# Patient Record
Sex: MALE
[De-identification: names, ages, dates, MRNs, and addresses within clinical notes are randomized per-mention and may not be internally consistent; named-entity substitution may affect disease eponyms.]

---

## 2019-11-06 ENCOUNTER — APPOINTMENT (OUTPATIENT)
Dept: PULMONOLOGY | Facility: CLINIC | Age: 29
End: 2019-11-06
Payer: COMMERCIAL

## 2019-11-06 ENCOUNTER — EMERGENCY (EMERGENCY)
Facility: HOSPITAL | Age: 29
LOS: 1 days | Discharge: HOME | End: 2019-11-06
Attending: EMERGENCY MEDICINE | Admitting: EMERGENCY MEDICINE
Payer: COMMERCIAL

## 2019-11-06 VITALS
RESPIRATION RATE: 16 BRPM | BODY MASS INDEX: 31.95 KG/M2 | WEIGHT: 249 LBS | OXYGEN SATURATION: 98 % | DIASTOLIC BLOOD PRESSURE: 84 MMHG | HEART RATE: 75 BPM | HEIGHT: 74 IN | SYSTOLIC BLOOD PRESSURE: 142 MMHG

## 2019-11-06 VITALS
HEART RATE: 78 BPM | RESPIRATION RATE: 16 BRPM | DIASTOLIC BLOOD PRESSURE: 75 MMHG | SYSTOLIC BLOOD PRESSURE: 127 MMHG | OXYGEN SATURATION: 100 % | TEMPERATURE: 98 F

## 2019-11-06 VITALS
HEIGHT: 74 IN | HEART RATE: 85 BPM | WEIGHT: 250 LBS | SYSTOLIC BLOOD PRESSURE: 134 MMHG | RESPIRATION RATE: 15 BRPM | OXYGEN SATURATION: 97 % | TEMPERATURE: 98 F | DIASTOLIC BLOOD PRESSURE: 82 MMHG

## 2019-11-06 DIAGNOSIS — R04.2 HEMOPTYSIS: ICD-10-CM

## 2019-11-06 LAB
ALBUMIN SERPL ELPH-MCNC: 4.3 G/DL — SIGNIFICANT CHANGE UP (ref 3.3–5)
ALP SERPL-CCNC: 84 U/L — SIGNIFICANT CHANGE UP (ref 40–120)
ALT FLD-CCNC: 39 U/L — SIGNIFICANT CHANGE UP (ref 12–78)
ANION GAP SERPL CALC-SCNC: 9 MMOL/L — SIGNIFICANT CHANGE UP (ref 5–17)
APTT BLD: 32.2 SEC — SIGNIFICANT CHANGE UP (ref 28.5–37)
AST SERPL-CCNC: 20 U/L — SIGNIFICANT CHANGE UP (ref 15–37)
BILIRUB SERPL-MCNC: 0.6 MG/DL — SIGNIFICANT CHANGE UP (ref 0.2–1.2)
BUN SERPL-MCNC: 15 MG/DL — SIGNIFICANT CHANGE UP (ref 7–23)
CALCIUM SERPL-MCNC: 8.9 MG/DL — SIGNIFICANT CHANGE UP (ref 8.5–10.1)
CHLORIDE SERPL-SCNC: 107 MMOL/L — SIGNIFICANT CHANGE UP (ref 96–108)
CO2 SERPL-SCNC: 25 MMOL/L — SIGNIFICANT CHANGE UP (ref 22–31)
CREAT SERPL-MCNC: 0.95 MG/DL — SIGNIFICANT CHANGE UP (ref 0.5–1.3)
D DIMER BLD IA.RAPID-MCNC: <150 NG/ML DDU — SIGNIFICANT CHANGE UP
GLUCOSE SERPL-MCNC: 90 MG/DL — SIGNIFICANT CHANGE UP (ref 70–99)
HCT VFR BLD CALC: 43.8 % — SIGNIFICANT CHANGE UP (ref 39–50)
HGB BLD-MCNC: 14.9 G/DL — SIGNIFICANT CHANGE UP (ref 13–17)
INR BLD: 1.05 RATIO — SIGNIFICANT CHANGE UP (ref 0.88–1.16)
MCHC RBC-ENTMCNC: 28.7 PG — SIGNIFICANT CHANGE UP (ref 27–34)
MCHC RBC-ENTMCNC: 34 GM/DL — SIGNIFICANT CHANGE UP (ref 32–36)
MCV RBC AUTO: 84.4 FL — SIGNIFICANT CHANGE UP (ref 80–100)
NRBC # BLD: 0 /100 WBCS — SIGNIFICANT CHANGE UP (ref 0–0)
PLATELET # BLD AUTO: 225 K/UL — SIGNIFICANT CHANGE UP (ref 150–400)
POTASSIUM SERPL-MCNC: 3.6 MMOL/L — SIGNIFICANT CHANGE UP (ref 3.5–5.3)
POTASSIUM SERPL-SCNC: 3.6 MMOL/L — SIGNIFICANT CHANGE UP (ref 3.5–5.3)
PROT SERPL-MCNC: 7.4 G/DL — SIGNIFICANT CHANGE UP (ref 6–8.3)
PROTHROM AB SERPL-ACNC: 11.9 SEC — SIGNIFICANT CHANGE UP (ref 10–12.9)
RBC # BLD: 5.19 M/UL — SIGNIFICANT CHANGE UP (ref 4.2–5.8)
RBC # FLD: 12.1 % — SIGNIFICANT CHANGE UP (ref 10.3–14.5)
SODIUM SERPL-SCNC: 141 MMOL/L — SIGNIFICANT CHANGE UP (ref 135–145)
TROPONIN I SERPL-MCNC: <.015 NG/ML — SIGNIFICANT CHANGE UP (ref 0.01–0.04)
WBC # BLD: 15.49 K/UL — HIGH (ref 3.8–10.5)
WBC # FLD AUTO: 15.49 K/UL — HIGH (ref 3.8–10.5)

## 2019-11-06 PROCEDURE — 71275 CT ANGIOGRAPHY CHEST: CPT | Mod: 26

## 2019-11-06 PROCEDURE — 85379 FIBRIN DEGRADATION QUANT: CPT

## 2019-11-06 PROCEDURE — 93970 EXTREMITY STUDY: CPT

## 2019-11-06 PROCEDURE — 71275 CT ANGIOGRAPHY CHEST: CPT

## 2019-11-06 PROCEDURE — 99284 EMERGENCY DEPT VISIT MOD MDM: CPT | Mod: 25

## 2019-11-06 PROCEDURE — 80053 COMPREHEN METABOLIC PANEL: CPT

## 2019-11-06 PROCEDURE — 99204 OFFICE O/P NEW MOD 45 MIN: CPT

## 2019-11-06 PROCEDURE — 85730 THROMBOPLASTIN TIME PARTIAL: CPT

## 2019-11-06 PROCEDURE — 71046 X-RAY EXAM CHEST 2 VIEWS: CPT

## 2019-11-06 PROCEDURE — 85610 PROTHROMBIN TIME: CPT

## 2019-11-06 PROCEDURE — 84484 ASSAY OF TROPONIN QUANT: CPT

## 2019-11-06 PROCEDURE — 87040 BLOOD CULTURE FOR BACTERIA: CPT

## 2019-11-06 PROCEDURE — 93010 ELECTROCARDIOGRAM REPORT: CPT

## 2019-11-06 PROCEDURE — 93970 EXTREMITY STUDY: CPT | Mod: 26

## 2019-11-06 PROCEDURE — 85027 COMPLETE CBC AUTOMATED: CPT

## 2019-11-06 PROCEDURE — 36415 COLL VENOUS BLD VENIPUNCTURE: CPT

## 2019-11-06 PROCEDURE — 93005 ELECTROCARDIOGRAM TRACING: CPT

## 2019-11-06 PROCEDURE — 99284 EMERGENCY DEPT VISIT MOD MDM: CPT

## 2019-11-06 PROCEDURE — 71046 X-RAY EXAM CHEST 2 VIEWS: CPT | Mod: 26

## 2019-11-06 RX ORDER — CLARITHROMYCIN 500 MG
1 TABLET ORAL
Qty: 1 | Refills: 0
Start: 2019-11-06 | End: 2019-11-10

## 2019-11-06 RX ORDER — SODIUM CHLORIDE 9 MG/ML
1000 INJECTION INTRAMUSCULAR; INTRAVENOUS; SUBCUTANEOUS ONCE
Refills: 0 | Status: COMPLETED | OUTPATIENT
Start: 2019-11-06 | End: 2019-11-06

## 2019-11-06 NOTE — HISTORY OF PRESENT ILLNESS
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\par \par Works as asst director facility [employer] \par No known ho tb exposure  Last March he tested negative for tb\par \par Ha staken amox in past  \par \par \par PT DESCRIPTION \par ***************\par DATE OF INITIAL VISIT 2019                 \par AGE AT INITIAL VISIT   2019 29 m                      \par REFERRING MD Dr Sami Castelan                    \par REFERRING MD FAX                 \par CONTACT                                  \par ALLERGY  2019 nka                        \par WEIGHT          2019 249 lb               \par BMI          2019 31.9                     \par SMOKING  2019 1 ppd started high school  cut down 2 y ago 1 cig a week                \par HABITS      \par 2019 Drinks once a week \par 2019 Took marijuanah last nigt\par 2019 No ivda              \par     \par FAMILY    2019 gmoter lung ca  age 78 gfather prostate ca No ho death at young age  \par OCCUPATION       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

## 2019-11-06 NOTE — ED PROVIDER NOTE - PROGRESS NOTE DETAILS
Dr Luciano called requesting the following: labs, CTA r/o PE, b/l LE venous duplex, blood cultures. If workup neg, wants pt d/c'd on abx and will f/u with him in the office in 1 week Pt asymptomatic. Pt well appearing, VSS. Mild leukocystosis noted on labs, CTA neg. Will d/c on Z nely as per Luciano request. All results discussed with patient and family. Questions answered. Pt to f/u with him in the office within 1 week. Pt verbalizes agreement and understanding of plan. No emergent concerns at this time. Pt stable for DC home.

## 2019-11-06 NOTE — ED ADULT TRIAGE NOTE - CHIEF COMPLAINT QUOTE
"I coughed up blood this morning when I woke up."  pt was seen by Dr. Luciano, sent to ED for CTA chest, venous doppler, lab work

## 2019-11-06 NOTE — ASSESSMENT
[FreeTextEntry1] : PROBLEM ASSESSMENT RECOMMENDATION\par \par HEMOPTYSIS \par ***********\par \par 11/6/2019 Pt had episode of hemoptysis at least a table spoonful \par 11/6/2019 refer to er for cta ch and V duplex to exclude vte Then blod culture and abio \par 11/6/2019 refer labs ECHO \par A1AT [facility] ab susan apla echo pt \par \par

## 2019-11-06 NOTE — ED PROVIDER NOTE - ATTENDING CONTRIBUTION TO CARE
I have personally performed a face to face diagnostic evaluation on this patient.  I have reviewed the PA note and agree with the history, exam, and plan of care, except as noted.  History and Exam by me shows patient sent to ER by Dr. Luciano for evaluation of episode of hemoptysis, around 3am, patient now feeling well, no fever, no shortness of breath, patient alert and oriented, heart and lungs clear, OP clear, abdomen soft, no pedal edema, f/u labs, ct angio, ekg, call dr. Luciano.

## 2019-11-06 NOTE — ED PROVIDER NOTE - PATIENT PORTAL LINK FT
You can access the FollowMyHealth Patient Portal offered by [facility] by registering at the following website: [email or web address]. By joining [facility]’s FollowMyHealth portal, you will also be able to view your health information using other applications (apps) compatible with our system.

## 2019-11-06 NOTE — PHYSICAL EXAM
[General Appearance - Well Developed] : well developed [Normal Appearance] : normal appearance [Well Groomed] : well groomed [General Appearance - Well Nourished] : well nourished [No Deformities] : no deformities [General Appearance - In No Acute Distress] : no acute distress [Normal Conjunctiva] : the conjunctiva exhibited no abnormalities [Eyelids - No Xanthelasma] : the eyelids demonstrated no xanthelasmas [Normal Oropharynx] : normal oropharynx [Neck Appearance] : the appearance of the neck was normal [Neck Cervical Mass (___cm)] : no neck mass was observed [Jugular Venous Distention Increased] : there was no jugular-venous distention [Thyroid Diffuse Enlargement] : the thyroid was not enlarged [Thyroid Nodule] : there were no palpable thyroid nodules [Heart Rate And Rhythm] : heart rate and rhythm were normal [Heart Sounds] : normal S1 and S2 [Murmurs] : no murmurs present [Respiration, Rhythm And Depth] : normal respiratory rhythm and effort [Exaggerated Use Of Accessory Muscles For Inspiration] : no accessory muscle use [Auscultation Breath Sounds / Voice Sounds] : lungs were clear to auscultation bilaterally [Abdomen Soft] : soft [Abdomen Tenderness] : non-tender [] : no hepato-splenomegaly [Abdomen Mass (___ Cm)] : no abdominal mass palpated [Abnormal Walk] : normal gait [Gait - Sufficient For Exercise Testing] : the gait was sufficient for exercise testing

## 2019-11-06 NOTE — ED PROVIDER NOTE - OBJECTIVE STATEMENT
30 yo M no sig PMHx presents to ED c/o hemoptysis x last night. Pt states that he woke up suddenly in the middle of night with urge to cough--- realized that sputum was completely bloody. Had another episode several minutes later- this time, blood tinged mucous. Pt had been asymptomatic prior to this episode. Pt states that this morning he woke up with very mild chest discomfort- mid-sternal 1/10- states very minimal, thinks he only notices pain since he is more alert to symptoms given last night's episode. Pt denies pleuritic chest pain, SOB, calf pain/swelling, recent travel, steroid use, fever/chills, easy bleeding/bruising, family hx bleeding disorders. Pt smokes cigarettes socially- about 1-2 cigarettes/week.

## 2019-11-06 NOTE — ED ADULT NURSE NOTE - OBJECTIVE STATEMENT
Sent by Pulmonologist. Present to ER with c/o of vomiting blood since this morning. Denies any chest pain or shortness of breath. Pulmonologist wants to order U/S, CT angio and Xray

## 2019-11-12 LAB
CULTURE RESULTS: SIGNIFICANT CHANGE UP
CULTURE RESULTS: SIGNIFICANT CHANGE UP
SPECIMEN SOURCE: SIGNIFICANT CHANGE UP
SPECIMEN SOURCE: SIGNIFICANT CHANGE UP

## 2019-12-06 NOTE — ED ADULT NURSE NOTE - PERIPHERAL VASCULAR
Spoke with patient in regards to his lab results and he is agreeable to starting weekly Vitamin D supplement. He would like the prescription sent to [facility] in [facility].    Patient # [phone]   WDL

## 2021-08-14 PROBLEM — Z78.9 OTHER SPECIFIED HEALTH STATUS: Chronic | Status: ACTIVE | Noted: 2019-11-06

## 2021-09-01 ENCOUNTER — APPOINTMENT (OUTPATIENT)
Dept: INTERNAL MEDICINE | Facility: CLINIC | Age: 31
End: 2021-09-01
Payer: COMMERCIAL

## 2021-09-01 VITALS
TEMPERATURE: 96.8 F | OXYGEN SATURATION: 97 % | SYSTOLIC BLOOD PRESSURE: 127 MMHG | RESPIRATION RATE: 17 BRPM | HEART RATE: 93 BPM | BODY MASS INDEX: 36.06 KG/M2 | DIASTOLIC BLOOD PRESSURE: 79 MMHG | WEIGHT: 281 LBS | HEIGHT: 74 IN

## 2021-09-01 DIAGNOSIS — Z87.891 PERSONAL HISTORY OF NICOTINE DEPENDENCE: ICD-10-CM

## 2021-09-01 DIAGNOSIS — Z82.49 FAMILY HISTORY OF ISCHEMIC HEART DISEASE AND OTHER DISEASES OF THE CIRCULATORY SYSTEM: ICD-10-CM

## 2021-09-01 PROCEDURE — 99385 PREV VISIT NEW AGE 18-39: CPT | Mod: 25

## 2021-09-01 PROCEDURE — 36415 COLL VENOUS BLD VENIPUNCTURE: CPT

## 2021-09-01 NOTE — HISTORY OF PRESENT ILLNESS
[FreeTextEntry1] : cpx [de-identified] : cpx\par prior 5 py tobacco d/c 2019\par not covid vaccinated

## 2021-09-01 NOTE — ASSESSMENT
[FreeTextEntry1] : labs\par wt loss discussed/increase activity\par current covid vaccination guidelines discussed

## 2021-09-02 LAB
ALBUMIN SERPL ELPH-MCNC: 4.6 G/DL
ALP BLD-CCNC: 92 U/L
ALT SERPL-CCNC: 35 U/L
ANION GAP SERPL CALC-SCNC: 14 MMOL/L
APPEARANCE: CLEAR
AST SERPL-CCNC: 22 U/L
BACTERIA: NEGATIVE
BASOPHILS # BLD AUTO: 0.09 K/UL
BASOPHILS NFR BLD AUTO: 0.8 %
BILIRUB SERPL-MCNC: 0.2 MG/DL
BILIRUBIN URINE: NEGATIVE
BLOOD URINE: NORMAL
BUN SERPL-MCNC: 14 MG/DL
CALCIUM SERPL-MCNC: 9.3 MG/DL
CHLORIDE SERPL-SCNC: 102 MMOL/L
CHOLEST SERPL-MCNC: 243 MG/DL
CO2 SERPL-SCNC: 25 MMOL/L
COLOR: YELLOW
CREAT SERPL-MCNC: 0.99 MG/DL
EOSINOPHIL # BLD AUTO: 0.19 K/UL
EOSINOPHIL NFR BLD AUTO: 1.7 %
ESTIMATED AVERAGE GLUCOSE: 111 MG/DL
GLUCOSE QUALITATIVE U: NEGATIVE
GLUCOSE SERPL-MCNC: 72 MG/DL
HBA1C MFR BLD HPLC: 5.5 %
HCT VFR BLD CALC: 47.3 %
HDLC SERPL-MCNC: 38 MG/DL
HGB BLD-MCNC: 14.6 G/DL
HYALINE CASTS: 1 /LPF
IMM GRANULOCYTES NFR BLD AUTO: 0.4 %
KETONES URINE: NEGATIVE
LDLC SERPL CALC-MCNC: NORMAL MG/DL
LEUKOCYTE ESTERASE URINE: NEGATIVE
LYMPHOCYTES # BLD AUTO: 2.93 K/UL
LYMPHOCYTES NFR BLD AUTO: 26.1 %
MAN DIFF?: NORMAL
MCHC RBC-ENTMCNC: 28.6 PG
MCHC RBC-ENTMCNC: 30.9 GM/DL
MCV RBC AUTO: 92.6 FL
MICROSCOPIC-UA: NORMAL
MONOCYTES # BLD AUTO: 0.79 K/UL
MONOCYTES NFR BLD AUTO: 7 %
NEUTROPHILS # BLD AUTO: 7.18 K/UL
NEUTROPHILS NFR BLD AUTO: 64 %
NITRITE URINE: NEGATIVE
NONHDLC SERPL-MCNC: 205 MG/DL
PH URINE: 6
PLATELET # BLD AUTO: 223 K/UL
POTASSIUM SERPL-SCNC: 4.3 MMOL/L
PROT SERPL-MCNC: 6.6 G/DL
PROTEIN URINE: NORMAL
RBC # BLD: 5.11 M/UL
RBC # FLD: 12.9 %
RED BLOOD CELLS URINE: 1 /HPF
SODIUM SERPL-SCNC: 141 MMOL/L
SPECIFIC GRAVITY URINE: 1.03
SQUAMOUS EPITHELIAL CELLS: 0 /HPF
TRIGL SERPL-MCNC: 642 MG/DL
TSH SERPL-ACNC: 1.66 UIU/ML
UROBILINOGEN URINE: NORMAL
WBC # FLD AUTO: 11.23 K/UL
WHITE BLOOD CELLS URINE: 1 /HPF

## 2021-09-04 LAB — LDLC SERPL DIRECT ASSAY-MCNC: 131 MG/DL

## 2021-09-10 ENCOUNTER — NON-APPOINTMENT (OUTPATIENT)
Age: 31
End: 2021-09-10

## 2021-10-17 NOTE — ED ADULT NURSE NOTE - EENT ASSESSMENT, MLM
Patient : Paz Worthington Age: 83 year old Sex: female   MRN: [id number] Encounter Date: 10/17/2021      History   CHIEF COMPLAINT    Chief Complaint   Patient presents with   • Fall       HPI    HPI  Paz Worthington is a 83 year old female presents emergency department with a fall.  She has dementia at baseline is oriented x2 and staff says that is her baseline.  They did noted that she was a little sleepier.  She is wheelchair-bound normally and staff had just talked on her previously and noted that she was on the floor.  She has a small laceration to the back of the scalp.  Unknown witnessed fall.      ALLERGIES:   Allergen Reactions   • Bentyl Other (See Comments)     unknown   • Penicillins Other (See Comments)     unknown       Prior to Admission Medications    ASPIRIN (ECOTRIN) 81 MG EC TABLET    Take 81 mg by mouth daily.    CHOLECALCIFEROL (CHOLECALCIFEROL) 25 MCG (1,000 UNITS) TABLET    Take 2,000 Units by mouth daily.    CLONIDINE (CATAPRES-TTS 2) 0.2 MG/24HR    Place 1 patch onto the skin 1 day a week. Mondays    FUROSEMIDE (LASIX) 20 MG TABLET    Take 20 mg by mouth daily.     HYDRALAZINE (APRESOLINE) 10 MG TABLET    Take 10 mg by mouth 3 times daily as needed (SBP > 170).     LEVOTHYROXINE 75 MCG TABLET    Take 75 mcg by mouth daily.    LISINOPRIL (ZESTRIL) 10 MG TABLET    Take 10 mg by mouth daily.     LORAZEPAM (ATIVAN) 0.5 MG TABLET    Take 0.5 mg by mouth 3 times daily.     METFORMIN (GLUCOPHAGE) 500 MG TABLET    Take 500 mg by mouth daily (with breakfast).     METOCLOPRAMIDE (REGLAN) 5 MG TABLET    Take 5 mg by mouth every 6 hours as needed for Nausea or Vomiting.    METOPROLOL SUCCINATE (TOPROL-XL) 25 MG 24 HR TABLET    Take 25 mg by mouth daily.    PAROXETINE (PAXIL) 40 MG TABLET    Take 40 mg by mouth every morning.    PIMAVANSERIN TARTRATE (NUPLAZID) 34 MG CAP    Take 34 mg by mouth daily.    POTASSIUM CHLORIDE TANJA ER PO    Take 20 mEq by mouth daily.     QUETIAPINE (SEROQUEL) 50 MG TABLET    Take 50 mg  by mouth 2 times daily.     SIMVASTATIN (ZOCOR) 40 MG TABLET    Take 40 mg by mouth nightly.        New Prescriptions    No medications on file       Past Medical History:   Diagnosis Date   • Anxiety    • Chronic kidney disease    • Congestive cardiac failure (CMS/HCC)    • Depression    • Diabetes mellitus (CMS/HCC)    • Essential (primary) hypertension    • High cholesterol    • Parkinson's disease (CMS/HCC)    • Personal history of schizophrenia    • Thyroid condition    • Uncomplicated senile dementia (CMS/HCC)    • Unsteady gait        Past Surgical History:   Procedure Laterality Date   • Cardiac catherization         History reviewed. No pertinent family history.    Social History     Tobacco Use   • Smoking status: Unknown If Ever Smoked   Vaping Use   • Vaping Use: never used   Substance Use Topics   • Alcohol use: Not Currently   • Drug use: Not Currently       Review of Systems   All other systems reviewed and are negative.      Physical Exam     Vitals:    10/17/21 1335 10/17/21 1405 10/17/21 1435 10/17/21 1505   BP: (!) 146/54 (!) 145/55 (!) 145/52 (!) 144/54   BP Location:       Patient Position:       Pulse: 66 71 72 64   Resp:    14   Temp:       TempSrc:       SpO2: 100% 100% 97% 99%   Weight:           Physical Exam  Vitals and nursing note reviewed.   Constitutional:       Appearance: Normal appearance. She is not ill-appearing.   HENT:      Head:      Comments: Right scalp laceration superficial       Mouth/Throat:      Mouth: Mucous membranes are moist.      Pharynx: Oropharynx is clear.   Eyes:      Extraocular Movements: Extraocular movements intact.      Conjunctiva/sclera: Conjunctivae normal.      Pupils: Pupils are equal, round, and reactive to light.   Cardiovascular:      Rate and Rhythm: Normal rate and regular rhythm.      Pulses: Normal pulses.      Heart sounds: Normal heart sounds.   Pulmonary:      Effort: Pulmonary effort is normal.      Breath sounds: Normal breath sounds.    Abdominal:      General: Bowel sounds are normal.      Palpations: Abdomen is soft.   Musculoskeletal:         General: Normal range of motion.   Skin:     General: Skin is warm and dry.   Neurological:      General: No focal deficit present.      Mental Status: She is alert.      Comments: Oriented x2   Psychiatric:         Mood and Affect: Mood normal.         Behavior: Behavior normal.         Final EKG interpretation by ED physician    EKG Interpretation  Rate: 61  Rhythm: normal sinus rhythm   Left bundle branch block.  No acute changes from June 1,2021 ekg        RADIOLOGY    XR HIPS 2 BILATERAL VIEWS EACH HIP W PELVIS 1 VIEW   Final Result   No evidence of acute fracture or dislocation.      Electronically Signed by: KAITLIN LONGO M.D.    Signed on: 10/17/2021 2:38 PM          CT HEAD WO CONTRAST   Final Result   No evidence of acute intracranial process.  No significant interval change.      Electronically Signed by: KAITLIN LONGO M.D.    Signed on: 10/17/2021 1:52 PM          CT CERVICAL SPINE WO CONTRAST   Final Result   Multilevel degenerative changes without evidence of acute cervical spine   fracture or subluxation.       Electronically Signed by: KAITLIN LONGO M.D.    Signed on: 10/17/2021 1:58 PM          XR CHEST PA OR AP 1 VIEW   Final Result   No evidence of acute cardiopulmonary process.  No significant interval   change.      Electronically Signed by: KAITLIN LONGO M.D.    Signed on: 10/17/2021 1:20 PM                LABS    Results for orders placed or performed during the hospital encounter of 10/17/21   Comprehensive Metabolic Panel   Result Value    Fasting Status     Sodium 137    Potassium 4.5    Chloride 108 (H)    Carbon Dioxide 28    Anion Gap 6 (L)    Glucose 149 (H)    BUN 34 (H)    Creatinine 2.01 (H)    Glomerular Filtration Rate 22 (L)     Comment: eGFR 15 - 29 mL/min/1.73 m2 = Severe decrease in kidney function. Stage 4 CKD (chronic kidney disease), or severe kidney disease. Estimated GFR  calculated using the 2009 CKD-EPI creatinine equation.    BUN/ Creatinine Ratio 17    Calcium 8.8    Bilirubin, Total 0.2    GOT/AST 11    GPT/ALT 14    Alkaline Phosphatase 70    Albumin 3.0 (L)    Protein, Total 6.3 (L)    Globulin 3.3    A/G Ratio 0.9 (L)   Urinalysis & Reflex Microscopy With Culture If Indicated   Result Value    COLOR, URINALYSIS Yellow    APPEARANCE, URINALYSIS Hazy    GLUCOSE, URINALYSIS Trace (A)    BILIRUBIN, URINALYSIS Negative    KETONES, URINALYSIS Negative    SPECIFIC GRAVITY, URINALYSIS 1.016    OCCULT BLOOD, URINALYSIS Negative    PH, URINALYSIS 5.0    PROTEIN, URINALYSIS Negative    UROBILINOGEN, URINALYSIS 0.2    NITRITE, URINALYSIS Negative    LEUKOCYTE ESTERASE, URINALYSIS Moderate (A)    SQUAMOUS EPITHELIAL, URINALYSIS 1 to 5    ERYTHROCYTES, URINALYSIS 3 to 5 (A)    LEUKOCYTES, URINALYSIS 11 to 25 (A)    BACTERIA, URINALYSIS Few (A)    HYALINE CASTS, URINALYSIS 6 to 10 (A)    MUCUS Present   Magnesium   Result Value    Magnesium 1.3 (L)   CBC with Automated Differential (performable only)   Result Value    WBC 7.5    RBC 3.47 (L)    HGB 9.8 (L)    HCT 31.5 (L)    MCV 90.8    MCH 28.2    MCHC 31.1 (L)    RDW-CV 12.8    RDW-SD 41.6        NRBC 0    Neutrophil, Percent 71    Lymphocytes, Percent 21    Mono, Percent 6    Eosinophils, Percent 2    Basophils, Percent 0    Immature Granulocytes 0    Absolute Neutrophils 5.3    Absolute Lymphocytes 1.5    Absolute Monocytes 0.4    Absolute Eosinophils  0.1    Absolute Basophils 0.0    Absolute Immmature Granulocytes 0.0   Thyroid Stimulating Hormone   Result Value    TSH 0.349 (L)   Troponin I Ultra Sensitive   Result Value    Troponin I, Ultra Sensitive 0.03   Troponin I Ultra Sensitive   Result Value    Troponin I, Ultra Sensitive 0.06 (HH)     Comment: -;[id number] CALLED CRITICAL RESULTS ON 10/17/2021 @ 1521 TO AND READ BACK BY HERB RIVERA RN in ER  Consistent with myocardial injury.  Mild elevations of  troponin may indicate  noninfarction cardiac injury or early myocardial infarction.  Serial studies may be helpful.   Electrocardiogram 12-Lead   Result Value    Ventricular Rate EKG/Min (BPM) 61    Atrial Rate (BPM) 61    PA-Interval (MSEC) 206    QRS-Interval (MSEC) 136    QT-Interval (MSEC) 468    QTc 471    P Axis (Degrees) 58    R Axis (Degrees) -22    T Axis (Degrees) 88    REPORT TEXT      Normal sinus rhythm  Left bundle branch block  Abnormal ECG  When compared with ECG of  01-JUN-2021 23:06,  premature atrial complexes  are no longer  present  Confirmed by SEBASTIÁN OTERO DO ([id number]) on 10/17/2021 2:43:09 PM     Rapid SARS-CoV-2 by PCR    Specimen: Nasopharyngeal; Swab   Result Value    Rapid SARS-COV-2 by PCR Not Detected    Isolation Guidelines      Comment: Do not use this test result as the sole decision-maker for discontinuation of isolation.   Clinical evaluation should be considered for other respiratory illness requiring transmission-based isolation.    -    No fever (<99.0 F/37.2 C) for at least 24 hours without the use of fever-reducing medications    AND  -    Respiratory symptoms have improved or resolved (e.g. cough, shortness of breath)     AND  -    COVID-19 negative test    See COVID-19 Deisolation Resource Guide    Procedural Comment      Comment: SARS-CoV-2 nucleic acid has not been detected indicating the absence of COVID-19.       Testing was performed using the [employer] Xpert Xpress SARS-CoV-2 RT-PCR assay that has been given Emergency Use Authorization (EUA) by the [place] Food and Drug Administration (FDA).  These results are considered definitive and do not need to be confirmed by another method.   GLUCOSE, BEDSIDE - POINT OF CARE   Result Value    GLUCOSE, BEDSIDE - POINT OF CARE 116 (H)         Procedures  Laceration Repair    Date/Time: 10/17/2021 3:02 PM  Performed by: JARET Hooks  Authorized by: JARET Hooks     Consent:     Consent obtained:  Verbal    Consent given by:  Patient     Risks discussed:  Infection, need for additional repair, nerve damage, poor cosmetic result, poor wound healing, pain, retained foreign body and vascular damage  Laceration details:     Location:  Scalp    Scalp location:  R temporal    Length (cm):  2  Repair type:     Repair type:  Simple  Skin repair:     Repair method:  Staples    Number of staples:  3  Approximation:     Approximation:  Loose  Post-procedure details:     Dressing:  Antibiotic ointment    Patient tolerance of procedure:  Tolerated well, no immediate complications  Comments:      Site irrigated/cleaned prior to closure         Medications   magnesium sulfate 2 g in 50 mL premix IVPB (2 g Intravenous New Bag 10/17/21 1517)   sodium chloride (NORMAL SALINE) 0.9 % bolus 500 mL (0 mLs Intravenous Completed 10/17/21 1400)   diphtheria-pertussis (acellular)-tetanus (BOOSTRIX) 5-2.5-18.5 LF-MCG/0.5 vaccine 0.5 mL (0.5 mLs Intramuscular Given 10/17/21 1516)       [unfilled]    [unfilled]    Vitals:    10/17/21 1335 10/17/21 1405 10/17/21 1435 10/17/21 1505   BP: (!) 146/54 (!) 145/55 (!) 145/52 (!) 144/54   BP Location:       Patient Position:       Pulse: 66 71 72 64   Resp:    14   Temp:       TempSrc:       SpO2: 100% 100% 97% 99%   Weight:            ED Course- MDM      Rationale:  Multiple differential diagnoses were considered. The patient / caregivers were apprised of diagnostic / treatment options including alternate modes of care, in addition to the risks and benefits, for this medical condition. Based on this discussion the patient / caregiver agrees with this chosen diagnostic and treatment plan.           Present emergency department with a fall unwitnessed from the nursing home.  Patient sustained a laceration to the scalp region on the right side behind the ear.  CT head and neck without acute findings.  Patient's labs reveal patient is slightly worsening GHAZAL.  Patient also has a low magnesium level.  Other labs are unremarkable  except for mildly elevated troponin.  There is no acute EKG changes.  She has no chest pain.  Patient was given 500 cc of fluid bolus.  Otherwise patient remained stable in the emergency department.  We will admit for further evaluation.      Impression:  The primary encounter diagnosis was Fall against object. Diagnoses of Hypomagnesemia and Laceration of scalp, initial encounter were also pertinent to this visit.    Follow Up:  No follow-up provider specified.     New Prescriptions    No medications on file           The patient was seen independently by the NP, with physician supervision available as necessitated.     Supervision provided by Dr. Horta  Signed:  Patricia Kennedy NP   10/17/2021  4:49 PM     JARET Hooks  10/17/21 2953     WDL

## 2023-11-07 ENCOUNTER — NON-APPOINTMENT (OUTPATIENT)
Age: 33
End: 2023-11-07

## 2024-03-03 ENCOUNTER — NON-APPOINTMENT (OUTPATIENT)
Age: 34
End: 2024-03-03

## 2024-04-10 ENCOUNTER — APPOINTMENT (OUTPATIENT)
Dept: INTERNAL MEDICINE | Facility: CLINIC | Age: 34
End: 2024-04-10
Payer: COMMERCIAL

## 2024-04-10 VITALS
HEART RATE: 80 BPM | BODY MASS INDEX: 36.04 KG/M2 | HEIGHT: 72.56 IN | WEIGHT: 269 LBS | OXYGEN SATURATION: 97 % | TEMPERATURE: 97.9 F

## 2024-04-10 VITALS — DIASTOLIC BLOOD PRESSURE: 64 MMHG | SYSTOLIC BLOOD PRESSURE: 110 MMHG

## 2024-04-10 DIAGNOSIS — Z78.9 OTHER SPECIFIED HEALTH STATUS: ICD-10-CM

## 2024-04-10 DIAGNOSIS — E78.2 MIXED HYPERLIPIDEMIA: ICD-10-CM

## 2024-04-10 DIAGNOSIS — Z00.00 ENCOUNTER FOR GENERAL ADULT MEDICAL EXAMINATION W/OUT ABNORMAL FINDINGS: ICD-10-CM

## 2024-04-10 PROCEDURE — 99395 PREV VISIT EST AGE 18-39: CPT

## 2024-04-10 RX ORDER — HYDROCORTISONE 25 MG/G
2.5 CREAM TOPICAL DAILY
Qty: 1 | Refills: 0 | Status: ACTIVE | COMMUNITY
Start: 2024-04-10 | End: 1900-01-01

## 2024-04-11 LAB
ALBUMIN SERPL ELPH-MCNC: 4.7 G/DL
ALP BLD-CCNC: 97 U/L
ALT SERPL-CCNC: 26 U/L
ANION GAP SERPL CALC-SCNC: 12 MMOL/L
APPEARANCE: CLEAR
AST SERPL-CCNC: 22 U/L
BACTERIA: NEGATIVE /HPF
BASOPHILS # BLD AUTO: 0.07 K/UL
BASOPHILS NFR BLD AUTO: 0.7 %
BILIRUB SERPL-MCNC: 0.5 MG/DL
BILIRUBIN URINE: NEGATIVE
BLOOD URINE: NEGATIVE
BUN SERPL-MCNC: 11 MG/DL
CALCIUM SERPL-MCNC: 9.3 MG/DL
CAST: 0 /LPF
CHLORIDE SERPL-SCNC: 102 MMOL/L
CHOLEST SERPL-MCNC: 237 MG/DL
CO2 SERPL-SCNC: 26 MMOL/L
COLOR: YELLOW
CREAT SERPL-MCNC: 0.9 MG/DL
EGFR: 116 ML/MIN/1.73M2
EOSINOPHIL # BLD AUTO: 0.1 K/UL
EOSINOPHIL NFR BLD AUTO: 1.1 %
EPITHELIAL CELLS: 1 /HPF
GLUCOSE QUALITATIVE U: NEGATIVE MG/DL
GLUCOSE SERPL-MCNC: 78 MG/DL
HCT VFR BLD CALC: 46.6 %
HDLC SERPL-MCNC: 53 MG/DL
HGB BLD-MCNC: 15.1 G/DL
IMM GRANULOCYTES NFR BLD AUTO: 0.4 %
KETONES URINE: NEGATIVE MG/DL
LDLC SERPL CALC-MCNC: 161 MG/DL
LDLC SERPL DIRECT ASSAY-MCNC: 168 MG/DL
LEUKOCYTE ESTERASE URINE: NEGATIVE
LYMPHOCYTES # BLD AUTO: 2.23 K/UL
LYMPHOCYTES NFR BLD AUTO: 23.6 %
MAN DIFF?: NORMAL
MCHC RBC-ENTMCNC: 28.3 PG
MCHC RBC-ENTMCNC: 32.4 GM/DL
MCV RBC AUTO: 87.4 FL
MICROSCOPIC-UA: NORMAL
MONOCYTES # BLD AUTO: 0.66 K/UL
MONOCYTES NFR BLD AUTO: 7 %
NEUTROPHILS # BLD AUTO: 6.35 K/UL
NEUTROPHILS NFR BLD AUTO: 67.2 %
NITRITE URINE: NEGATIVE
NONHDLC SERPL-MCNC: 184 MG/DL
PH URINE: 7
PLATELET # BLD AUTO: 225 K/UL
POTASSIUM SERPL-SCNC: 4.6 MMOL/L
PROT SERPL-MCNC: 6.9 G/DL
PROTEIN URINE: NEGATIVE MG/DL
RBC # BLD: 5.33 M/UL
RBC # FLD: 12.5 %
RED BLOOD CELLS URINE: 0 /HPF
SODIUM SERPL-SCNC: 140 MMOL/L
SPECIFIC GRAVITY URINE: 1.02
TRIGL SERPL-MCNC: 127 MG/DL
TSH SERPL-ACNC: 1.25 UIU/ML
UROBILINOGEN URINE: 0.2 MG/DL
WBC # FLD AUTO: 9.45 K/UL
WHITE BLOOD CELLS URINE: 0 /HPF

## 2025-01-28 ENCOUNTER — NON-APPOINTMENT (OUTPATIENT)
Age: 35
End: 2025-01-28

## 2025-04-28 ENCOUNTER — NON-APPOINTMENT (OUTPATIENT)
Age: 35
End: 2025-04-28

## 2025-04-30 ENCOUNTER — NON-APPOINTMENT (OUTPATIENT)
Age: 35
End: 2025-04-30

## 2025-04-30 ENCOUNTER — APPOINTMENT (OUTPATIENT)
Dept: INTERNAL MEDICINE | Facility: CLINIC | Age: 35
End: 2025-04-30
Payer: COMMERCIAL

## 2025-04-30 VITALS
RESPIRATION RATE: 16 BRPM | WEIGHT: 275 LBS | TEMPERATURE: 98.7 F | BODY MASS INDEX: 36.84 KG/M2 | HEART RATE: 79 BPM | HEIGHT: 72.59 IN | OXYGEN SATURATION: 96 %

## 2025-04-30 VITALS — SYSTOLIC BLOOD PRESSURE: 122 MMHG | DIASTOLIC BLOOD PRESSURE: 72 MMHG

## 2025-04-30 DIAGNOSIS — E78.2 MIXED HYPERLIPIDEMIA: ICD-10-CM

## 2025-04-30 DIAGNOSIS — Z00.00 ENCOUNTER FOR GENERAL ADULT MEDICAL EXAMINATION W/OUT ABNORMAL FINDINGS: ICD-10-CM

## 2025-04-30 DIAGNOSIS — Z81.8 FAMILY HISTORY OF OTHER MENTAL AND BEHAVIORAL DISORDERS: ICD-10-CM

## 2025-04-30 DIAGNOSIS — E66.9 OBESITY, UNSPECIFIED: ICD-10-CM

## 2025-04-30 LAB
APPEARANCE: CLEAR
BACTERIA: NEGATIVE /HPF
BILIRUBIN URINE: NEGATIVE
BLOOD URINE: ABNORMAL
CAST: 0 /LPF
COLOR: YELLOW
EPITHELIAL CELLS: 0 /HPF
GLUCOSE QUALITATIVE U: NEGATIVE MG/DL
KETONES URINE: NEGATIVE MG/DL
LEUKOCYTE ESTERASE URINE: NEGATIVE
MICROSCOPIC-UA: NORMAL
NITRITE URINE: NEGATIVE
PH URINE: 6
PROTEIN URINE: NORMAL MG/DL
RED BLOOD CELLS URINE: 0 /HPF
SPECIFIC GRAVITY URINE: 1.02
UROBILINOGEN URINE: 0.2 MG/DL
WHITE BLOOD CELLS URINE: 0 /HPF

## 2025-04-30 PROCEDURE — 99395 PREV VISIT EST AGE 18-39: CPT

## 2025-05-01 LAB
ALBUMIN SERPL ELPH-MCNC: 4.6 G/DL
ALP BLD-CCNC: 99 U/L
ALT SERPL-CCNC: 29 U/L
ANION GAP SERPL CALC-SCNC: 12 MMOL/L
AST SERPL-CCNC: 22 U/L
BASOPHILS # BLD AUTO: 0.09 K/UL
BASOPHILS NFR BLD AUTO: 0.9 %
BILIRUB SERPL-MCNC: 0.4 MG/DL
BUN SERPL-MCNC: 10 MG/DL
CALCIUM SERPL-MCNC: 9.2 MG/DL
CHLORIDE SERPL-SCNC: 102 MMOL/L
CHOLEST SERPL-MCNC: 234 MG/DL
CO2 SERPL-SCNC: 24 MMOL/L
CREAT SERPL-MCNC: 0.91 MG/DL
EGFRCR SERPLBLD CKD-EPI 2021: 113 ML/MIN/1.73M2
EOSINOPHIL # BLD AUTO: 0.13 K/UL
EOSINOPHIL NFR BLD AUTO: 1.2 %
GLUCOSE SERPL-MCNC: 102 MG/DL
HCT VFR BLD CALC: 47.2 %
HDLC SERPL-MCNC: 43 MG/DL
HGB BLD-MCNC: 15.5 G/DL
IMM GRANULOCYTES NFR BLD AUTO: 0.6 %
LDLC SERPL-MCNC: 140 MG/DL
LYMPHOCYTES # BLD AUTO: 2.34 K/UL
LYMPHOCYTES NFR BLD AUTO: 22.3 %
MAN DIFF?: NORMAL
MCHC RBC-ENTMCNC: 27.9 PG
MCHC RBC-ENTMCNC: 32.8 G/DL
MCV RBC AUTO: 85 FL
MONOCYTES # BLD AUTO: 0.59 K/UL
MONOCYTES NFR BLD AUTO: 5.6 %
NEUTROPHILS # BLD AUTO: 7.29 K/UL
NEUTROPHILS NFR BLD AUTO: 69.4 %
NONHDLC SERPL-MCNC: 191 MG/DL
PLATELET # BLD AUTO: 216 K/UL
POTASSIUM SERPL-SCNC: 4.3 MMOL/L
PROT SERPL-MCNC: 7 G/DL
RBC # BLD: 5.55 M/UL
RBC # FLD: 12.3 %
SODIUM SERPL-SCNC: 138 MMOL/L
TRIGL SERPL-MCNC: 281 MG/DL
TSH SERPL-ACNC: 1.07 UIU/ML
WBC # FLD AUTO: 10.5 K/UL

## 2025-08-19 ENCOUNTER — NON-APPOINTMENT (OUTPATIENT)
Age: 35
End: 2025-08-19